# Patient Record
(demographics unavailable — no encounter records)

---

## 2024-10-18 NOTE — RISK ASSESSMENT
[Grade: ____] : Grade: [unfilled] [With Teen] : teen [Has thought about hurting self or considered suicide] : has not thought about hurting self or considered suicide [Uses tobacco] : does not use tobacco [Uses drugs] : does not use drugs  [Drinks alcohol] : does not drink alcohol [Has had sexual intercourse] : has not had sexual intercourse [Gets depressed, anxious, or irritable/has mood swings] : gets depressed, anxious, or irritable/has mood swings [de-identified] : Lives with mother, cousins, sister - feels safe at home  [de-identified] : attends [employer]  [de-identified] : attracted to both
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
## 2024-10-18 NOTE — PHYSICAL EXAM
[NL] : regular rate and rhythm, normal S1, S2 audible, no murmurs [de-identified] : Left forearm: circular, flat marks & horizontal marks (well-healed) (from self-harm); Right forearm: horizontal lacerations (well-healed)

## 2024-10-18 NOTE — PHYSICAL EXAM
[NL] : regular rate and rhythm, normal S1, S2 audible, no murmurs [de-identified] : Left forearm: circular, flat marks & horizontal marks (well-healed) (from self-harm); Right forearm: horizontal lacerations (well-healed)

## 2024-10-18 NOTE — DISCUSSION/SUMMARY
[FreeTextEntry1] : 14 year old female presenting for review of immunizations and screening tests.   -Underimmunized. Limited records from country of origin.  -Ordered MMRV IgG and Hepatitis B surface antibody.  -Provided pt with VIS & consent form for all vaccines.  -Ordered HIV screen.  -Ordered screening for tuberculosis - Quantiferon ordered.  -Ordered screening for anemia - hemoglobin ordered.  -Return to health center for vaccines.   Note:  Pt is engaged in outside mental health counseling. Assessed safety: no acute safety concerns at time of visit. Provided pt with crisis resources. Pt is aware of services & support at [facility].
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
## 2024-10-18 NOTE — RISK ASSESSMENT
[Grade: ____] : Grade: [unfilled] [With Teen] : teen [Has thought about hurting self or considered suicide] : has not thought about hurting self or considered suicide [Uses tobacco] : does not use tobacco [Uses drugs] : does not use drugs  [Drinks alcohol] : does not drink alcohol [Has had sexual intercourse] : has not had sexual intercourse [Gets depressed, anxious, or irritable/has mood swings] : gets depressed, anxious, or irritable/has mood swings [de-identified] : Lives with mother, cousins, sister - feels safe at home  [de-identified] : attends [employer]  [de-identified] : attracted to both

## 2024-10-18 NOTE — BEGINNING OF VISIT
[Patient] : patient [] :  [[employer] ] : provided by [employer]   [Interpreters_IDNumber] : [id number] [TWNoteComboBox1] : Tuvaluan

## 2024-10-18 NOTE — PHYSICAL EXAM
[NL] : regular rate and rhythm, normal S1, S2 audible, no murmurs [de-identified] : Left forearm: circular, flat marks & horizontal marks (well-healed) (from self-harm); Right forearm: horizontal lacerations (well-healed)

## 2024-10-18 NOTE — RISK ASSESSMENT
[Grade: ____] : Grade: [unfilled] [With Teen] : teen [Has thought about hurting self or considered suicide] : has not thought about hurting self or considered suicide [Uses tobacco] : does not use tobacco [Uses drugs] : does not use drugs  [Drinks alcohol] : does not drink alcohol [Has had sexual intercourse] : has not had sexual intercourse [Gets depressed, anxious, or irritable/has mood swings] : gets depressed, anxious, or irritable/has mood swings [de-identified] : Lives with mother, cousins, sister - feels safe at home  [de-identified] : attends [employer]  [de-identified] : attracted to both

## 2024-10-18 NOTE — DISCUSSION/SUMMARY
[FreeTextEntry1] : 14 year old female presenting for review of immunizations and screening tests.   -Underimmunized. Limited records from country of origin.  -Ordered MMRV IgG and Hepatitis B surface antibody.  -Provided pt with VIS & consent form for all vaccines.  -Ordered HIV screen.  -Ordered screening for tuberculosis - Quantiferon ordered.  -Ordered screening for anemia - hemoglobin ordered.  -Return to health center for vaccines.   Note:  Pt is engaged in outside mental health counseling. Assessed safety: no acute safety concerns at time of visit. Provided pt with crisis resources. Pt is aware of services & support at [facility].

## 2024-10-18 NOTE — BEGINNING OF VISIT
[Patient] : patient [] :  [[employer] ] : provided by [employer]   [Interpreters_IDNumber] : [id number] [TWNoteComboBox1] : Lao

## 2024-10-18 NOTE — DISCUSSION/SUMMARY
[FreeTextEntry1] : 14 year old female presenting for review of immunizations and screening tests.   -Underimmunized. Limited records from country of origin.  -Ordered MMRV IgG and Hepatitis B surface antibody.  -Provided pt with VIS & consent form for all vaccines.  -Ordered HIV screen.  -Ordered screening for tuberculosis - Quantiferon ordered.  -Ordered screening for anemia - hemoglobin ordered.  -Return to health center for vaccines.   Note:  Pt is engaged in outside mental health counseling. Assessed safety: no acute safety concerns at time of visit. Provided pt with crisis resources. Pt is aware of services & support at [facility].
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
## 2024-10-18 NOTE — BEGINNING OF VISIT
[Patient] : patient [] :  [[employer] ] : provided by [employer]   [Interpreters_IDNumber] : [id number] [TWNoteComboBox1] : Nepalese

## 2024-11-15 NOTE — HISTORY OF PRESENT ILLNESS
[de-identified] : 15 year old female with Hx of Depression and has missed numerous appointments for PE and vaccine visits. [FreeTextEntry6] : Pt. had vaccine appointment today but did not have consents signed.

## 2024-11-15 NOTE — DISCUSSION/SUMMARY
[FreeTextEntry1] :  used for clarification during visit. Student states she missed PE appointments since she was not given a card. We rescheduled her for two appointments and clarified with . She will get Mothers consents and RTC for vaccines next Friday. Reviewed lab results done in October but will still need obesity labs done at PE.

## 2024-11-15 NOTE — BEGINNING OF VISIT
[Patient] : patient [] :  [[employer] ] : provided by [employer]   [Time Spent: ____ minutes] : Total time spent using  services: [unfilled] minutes. The patient's primary language is not English thus required  services. [Interpreters_IDNumber] : [id number] [Interpreters_FullName] : Willa [TWNoteComboBox1] : North Korean

## 2024-11-22 NOTE — DISCUSSION/SUMMARY
[FreeTextEntry1] : Pt. given Hep B, Tdap, IPV and Varicella and tolerated well. Observed for 10 minutes and CIR updated. Will RTC on 12/2 for PE and will give another round at that time. Left to go home after visit.  [] : The components of the vaccine(s) to be administered today are listed in the plan of care. The disease(s) for which the vaccine(s) are intended to prevent and the risks have been discussed with the caretaker.  The risks are also included in the appropriate vaccination information statements which have been provided to the patient's caregiver.  The caregiver has given consent to vaccinate.

## 2024-11-22 NOTE — REASON FOR VISIT
[Patient] : patient [[employer] ] : provided by [employer]   [Time Spent: ____ minutes] : Total time spent using  services: [unfilled] minutes. The patient's primary language is not English thus required  services. [Interpreters_IDNumber] : [id number] [Interpreters_FullName] : Lucia [TWNoteComboBox1] : Azerbaijani

## 2024-11-22 NOTE — HISTORY OF PRESENT ILLNESS
[Hepatitis B] : Hepatitis B [Varicella] : Varicella [Tdap] : Tdap [IPV] : IPV [FreeTextEntry1] : 15 year old female who has parental consent image on her phone. No problems with previous vaccines and denies latex or vaccine component allergy. Feels well today.

## 2025-01-28 NOTE — BEGINNING OF VISIT
[Patient] : patient [[employer] ] : provided by [employer]   [] :  [Time Spent: ____ minutes] : Total time spent using  services: [unfilled] minutes. The patient's primary language is not English thus required  services. [Interpreters_IDNumber] : [id number] [TWNoteComboBox1] : Hong Konger

## 2025-01-28 NOTE — DISCUSSION/SUMMARY
[FreeTextEntry1] : 15-year-old female presenting with headache  Headache -acetaminophen 325mg tab, x2, PO, NOW -Counseled re: SMART headache management: sleep 8-9 hours per night, eat regular meals including breakfast, increase hydration, exercise regularly, reduce stress, and avoid triggers. -Return to clinic as needed if headaches increase in severity or frequency.

## 2025-01-28 NOTE — HISTORY OF PRESENT ILLNESS
[de-identified] : headache [FreeTextEntry6] : 15-year-old female presenting with complaints of a headache that started in class before coming to the health center  Headache: Pain 3/10 along the temporal regions of the head. Patient reports she gets headaches whenever there are loud noises and reports the classroom, she was in was loud. Patient reports she otherwise does not get headaches often -Patient denies changes in vision, dizziness, or weakness.  Patient reports she has not eaten lunch today and has not had any water to drink either Had breakfast today: rice with cheese and eggs Dinner last night: Cannot remember  Went to bed at 3am and woke up at 5am- Went to bed late because she was doing her homework  LMP: Earlier Jan. 2025, unsure exactly when Bleeding x5 days 4 pads per day Regular monthly periods  Denies any URI symptoms

## 2025-04-09 NOTE — HISTORY OF PRESENT ILLNESS
[Hepatitis B] : Hepatitis B [Varicella] : Varicella [Tdap] : Tdap [Meningococcal ACWY] : Meningococcal ACWY [IPV] : IPV [FreeTextEntry1] : 15-year-old female presenting for vaccines  Patient denies history of adverse reaction to vaccines in the past. Patient denies history of asthma, seizures, anaphylaxis, thrombocytopenia, Guillain [place], blood transfusion, or latex allergy. Patient denies presence of any immunocompromised individuals in the home. Patient denies recent illness. Patient feels well. No complaints.  Patient also reports having menstrual cramps. Reports menses started on 4/5/25 Patient reports she usually has cramps throughout her menses Pain 9/10 in the suprapubic region of the abdomen Denies nausea, vomiting

## 2025-04-09 NOTE — REVIEW OF SYSTEMS
[Abdominal Pain] : abdominal pain [PO Intolerance] : PO tolerance [Vomiting] : no vomiting [Diarrhea] : no diarrhea

## 2025-04-09 NOTE — REASON FOR VISIT
[Patient] : patient [[employer] ] : provided by [employer]   [Time Spent: ____ minutes] : Total time spent using  services: [unfilled] minutes. The patient's primary language is not English thus required  services. [Interpreters_IDNumber] : [id number] [TWNoteComboBox1] : Marshallese

## 2025-04-09 NOTE — DISCUSSION/SUMMARY
[FreeTextEntry1] : 15 year old female presenting for vaccines and with dysmenorrhea  Vaccines  Vaccines administered without incident, patient tolerated well -Tdap, Hepatitis B, IPV, Meningitis, Varicella vaccines administered -Required vaccines UTD -Return in 1-2 weeks for vaccines. Patient with CPE tomorrow, may receive vaccines also if time permits  Dysmenorrhea -ibuprofen 400mg tab, x1, PO -Return to clinic as needed

## 2025-04-10 NOTE — PHYSICAL EXAM
[Alert] : alert [No Acute Distress] : no acute distress [Normocephalic] : normocephalic [EOMI Bilateral] : EOMI bilateral [Clear tympanic membranes with bony landmarks and light reflex present bilaterally] : clear tympanic membranes with bony landmarks and light reflex present bilaterally  [Pink Nasal Mucosa] : pink nasal mucosa [Nonerythematous Oropharynx] : nonerythematous oropharynx [Supple, full passive range of motion] : supple, full passive range of motion [No Palpable Masses] : no palpable masses [Clear to Auscultation Bilaterally] : clear to auscultation bilaterally [Regular Rate and Rhythm] : regular rate and rhythm [Normal S1, S2 audible] : normal S1, S2 audible [No Murmurs] : no murmurs [Soft] : soft [NonTender] : non tender [Non Distended] : non distended [Normoactive Bowel Sounds] : normoactive bowel sounds [No Hepatomegaly] : no hepatomegaly [No Splenomegaly] : no splenomegaly [No Abnormal Lymph Nodes Palpated] : no abnormal lymph nodes palpated [Normal Muscle Tone] : normal muscle tone [No Gait Asymmetry] : no gait asymmetry [No pain or deformities with palpation of bone, muscles, joints] : no pain or deformities with palpation of bone, muscles, joints [Straight] : straight [Cranial Nerves Grossly Intact] : cranial nerves grossly intact [de-identified] : Well-healed self-harm lesions on bilateral forearms. Slight acanthosis nigricans on neck.

## 2025-04-10 NOTE — HISTORY OF PRESENT ILLNESS
[Heavy Bleeding] : no heavy bleeding [Painful Cramps] : no painful cramps [Uses electronic nicotine delivery system] : does not use electronic nicotine delivery system [Uses tobacco] : does not use tobacco [Uses drugs] : does not use drugs  [Drinks alcohol] : does not drink alcohol [FreeTextEntry7] : Patient reports that she was admitted to [facility] for 3 weeks in August due to an overdose of 14 sleeping pills. She reports that she went to [facility] in December because she had not eaten for 2 weeks and started spitting out blood. She had X-rays done. She understood that there was some problem.  [de-identified] : Working papers [de-identified] : Patient states that she goes to the dentist every 5 months.  [de-identified] : Patient lives with mom and 7 y/o sister. Feels safe at home.  [de-identified] : Patient struggles with notes and is only passing two classes. She cannot stay at school for extra help because she has to  her sister. She reports a history of bullying in the past, but this is not happening anymore, and she does not have contact with her bullies.  [de-identified] : Patient eats breakfast but has nothing other than 2 glasses of water for the rest of the day because she worries about gaining weight. Her typical breakfast includes rice or bread. She sometimes eats lunch when her mom is watching, and she will then purge. She denies exercise, inappropriate use of medication to lose weight, or counting calories. [de-identified] : Patient does not exercise. She likes to read and go home and sleep. She sometimes watches her mom's friend's son.  [de-identified] : Patient denies any history of sexual activity but would like STI screening and pregnancy testing.  [de-identified] : Patient has anxiety and depression. She reports history of suicide attempt in August but denies any thoughts of wanting to hurt herself since then. History of self-harm by cutting, most recently 5 months ago. She denies any history of wanting to hurt others. She states that she follows with a psychiatrist monthly (virtually) and psychologist every Saturday (virtually). She takes fluoxetine 40 mg daily.  [FreeTextEntry1] : 15 y/o F with obesity, disordered eating, depression with previous suicidality, and anxiety presents for well visit and working papers. She follows with pediatrician Dr. Carol Cuellar and had a well visit 1 month ago. Patient states that she has not received vaccines with her pediatrician because she gave her the option to receive them at school.     Patient has disordered eating, including restriction and purging. She was seen at [facility] in December after not eating for 2 weeks. Currently, she eats breakfast (i.e. bread, rice) and drinks 2 glasses of water per day. She does not like to eat anything else because she fears weight gain. She does not drink anything other than water. She sometimes eats later in the day if her mom is home and watching her. She purges if she eats lunch. She most recently purged 5 weeks ago. Patient believes that her max weight is 74 kg; however, this is lower than her weight today. Mom tries to give her snacks in the afternoon, such as fruit and juice. She did not know that patient purged 5 weeks ago, but she does notice that patient has been making an effort and has been doing better. She suspects that patient is confused and thinks that she will lose weight if she stops eating. Mom expresses that she is there for whatever patient may need.

## 2025-04-10 NOTE — RISK ASSESSMENT
[[id number]] : 0 [Have you ever fainted, passed out or had an unexplained seizure suddenly and without warning, especially during exercise or in response] : Have you ever fainted, passed out or had an unexplained seizure suddenly and without warning, especially during exercise or in response to sudden loud noises such as doorbells, alarm clocks and ringing telephones? No [Have you ever had exercise-related chest pain or shortness of breath?] : Have you ever had exercise-related chest pain or shortness of breath? No [Has anyone in your immediate family (parents, grandparents, siblings) or other more distant relatives (aunts, uncles, cousins)  of heart] : Has anyone in your immediate family (parents, grandparents, siblings) or other more distant relatives (aunts, uncles, cousins)  of heart problems or had an unexpected sudden death before age 50 (This would include unexpected drownings, unexplained car accidents in which the relative was driving or sudden infant death syndrome.)? No [Are you related to anyone with hypertrophic cardiomyopathy or hypertrophic obstructive cardiomyopathy, Marfan syndrome, arrhythmogenic] : Are you related to anyone with hypertrophic cardiomyopathy or hypertrophic obstructive cardiomyopathy, Marfan syndrome, arrhythmogenic right ventricular cardiomyopathy, long QT syndrome, short QT syndrome, Brugada syndrome or catecholaminergic polymorphic ventricular tachycardia, or anyone younger than 50 years with a pacemaker or implantable defibrillator? No

## 2025-04-10 NOTE — REVIEW OF SYSTEMS
[Fever] : no fever [Chills] : no chills [Night Sweats] : no night sweats [Headache] : no headache [Nasal Congestion] : no nasal congestion [Sore Throat] : no sore throat [Chest Pain] : no chest pain [Cough] : no cough [Shortness of Breath] : no shortness of breath [Vomiting] : no vomiting [Diarrhea] : no diarrhea [Constipation] : no constipation [Abdominal Pain] : no abdominal pain [Seizure] : no seizures [Weakness] : no weakness [Lightheadness] : no lightheadness [Dizziness] : no dizziness [Fainting] : no fainting [Myalgia] : no myalgia [Back Pain] : no back pain [Rash] : no rash [Dry Skin] : no dry skin [Dysuria] : no dysuria [Vaginal Dischage] : no vaginal discharge [Vaginal Itch] : no vaginal itch [Vaginal Pain] : no vaginal pain

## 2025-04-10 NOTE — DISCUSSION/SUMMARY
[FreeTextEntry1] : 15 y/o F with obesity, disordered eating, depression with previous suicidality, and anxiety presents for well visit and working papers. She follows with pediatrician Dr. Carol Cuellar and had a well visit 1 month ago, but she was told by someone at school that she needs a well visit here in order to obtain working papers. She was hospitalized in August after a suicide attempt and was seen in another hospital in December after not eating for 2 weeks. She follows regularly with a psychiatrist and is on fluoxetine. She also follows regularly with a therapist. She denies any current self-harm or SI/HI. She failed her vision screen today. Her BMI is 132% of the 95th percentile. Her vital signs are unremarkable. Her physical exam is notable for obesity, well-healed self-harm scars on both forearms, and slight acanthosis nigricans. She received HPV vaccine dose #1 and hepatitis A dose #1 today. She is in the process of catching up with her vaccines and will need to return for subsequent doses. We ordered CMP, HbA1c, lipid profile, STD screening, and urine pregnancy testing today. She should return to clinic the Wednesday after next for a follow up visit. In the meantime, we provided mom with the phone number of adolescent medicine and recommended her to schedule an initial eating disorder evaluation. We will request our  to contact mom to assist with scheduling. Mom reports that patient had a vision appointment a couple of days ago; she is waiting on new glasses, and they are considering surgery in the future. Informed mom that patient failed vision screen today and should return to clinic to repeat it with her new glasses.